# Patient Record
Sex: MALE | Race: WHITE | Employment: OTHER | URBAN - METROPOLITAN AREA
[De-identification: names, ages, dates, MRNs, and addresses within clinical notes are randomized per-mention and may not be internally consistent; named-entity substitution may affect disease eponyms.]

---

## 2022-06-13 DIAGNOSIS — M25.561 RIGHT KNEE PAIN, UNSPECIFIED CHRONICITY: Primary | ICD-10-CM

## 2022-06-15 DIAGNOSIS — M25.561 RIGHT KNEE PAIN, UNSPECIFIED CHRONICITY: ICD-10-CM

## 2022-06-15 PROCEDURE — 73562 X-RAY EXAM OF KNEE 3: CPT | Performed by: ORTHOPAEDIC SURGERY

## 2022-06-21 ENCOUNTER — VIRTUAL VISIT (OUTPATIENT)
Dept: ORTHOPEDIC SURGERY | Age: 72
End: 2022-06-21
Payer: MEDICARE

## 2022-06-21 DIAGNOSIS — M25.561 PAIN IN BOTH KNEES, UNSPECIFIED CHRONICITY: Primary | ICD-10-CM

## 2022-06-21 DIAGNOSIS — M25.561 PAIN IN BOTH KNEES, UNSPECIFIED CHRONICITY: ICD-10-CM

## 2022-06-21 DIAGNOSIS — M25.562 PAIN IN BOTH KNEES, UNSPECIFIED CHRONICITY: ICD-10-CM

## 2022-06-21 DIAGNOSIS — M25.562 PAIN IN BOTH KNEES, UNSPECIFIED CHRONICITY: Primary | ICD-10-CM

## 2022-06-21 DIAGNOSIS — M17.0 OSTEOARTHRITIS OF BOTH KNEES, UNSPECIFIED OSTEOARTHRITIS TYPE: ICD-10-CM

## 2022-06-21 PROCEDURE — 99204 OFFICE O/P NEW MOD 45 MIN: CPT | Performed by: ORTHOPAEDIC SURGERY

## 2022-06-21 RX ORDER — HYDROCODONE BITARTRATE AND ACETAMINOPHEN 7.5; 325 MG/1; MG/1
1 TABLET ORAL
COMMUNITY
Start: 2022-02-09 | End: 2022-09-28

## 2022-06-21 RX ORDER — ONDANSETRON 4 MG/1
4 TABLET, FILM COATED ORAL
COMMUNITY
Start: 2022-02-09

## 2022-06-21 RX ORDER — FLUOCINOLONE ACETONIDE 0.11 MG/ML
4 OIL AURICULAR (OTIC)
COMMUNITY
Start: 2021-08-25

## 2022-06-21 RX ORDER — MULTIVITAMIN
1 CAPSULE ORAL DAILY
COMMUNITY
End: 2022-09-28

## 2022-06-21 NOTE — PROGRESS NOTES
Name: Jessica Wilkerson    : 1950     Service Dept: [address] [place] [address] and Sports Medicine    Patient's Pharmacies:    [facility]  Phone: [phone]     [facility] [id number], [address] [id number]  Phone: [phone] Fax: [phone]       Chief Complaint   Patient presents with    Knee Pain        There were no vitals taken for this visit. Allergies   Allergen Reactions    Demerol [Meperidine] Other (comments)     Sweating ,tunnel vision      Current Outpatient Medications   Medication Sig Dispense Refill    fluocinolone acetonide oiL 0.01 % drop 4 Drops by Otic route.  HYDROcodone-acetaminophen (NORCO) 7.5-325 mg per tablet Take 1 Tablet by mouth every four (4) hours as needed.  ondansetron hcl (ZOFRAN) 4 mg tablet Take 4 mg by mouth every eight (8) hours as needed.  lactobacillus rhamnosus gg 10 billion cell (CULTURELLE) 10 billion cell capsule Take 1 Capsule by mouth daily.  multivitamin capsule Take 1 Capsule by mouth daily.  oxyCODONE-acetaminophen (PERCOCET) 5-325 mg per tablet Take 1 Tab by mouth every four (4) hours as needed for Pain. Max Daily Amount: 6 Tabs. 30 Tab 0    omeprazole (PRILOSEC) 20 mg capsule Take 20 mg by mouth as needed. Indications: GASTROESOPHAGEAL REFLUX      fexofenadine (ALLEGRA) 180 mg tablet Take 180 mg by mouth daily as needed for Allergies.  vitamin E (AQUA GEMS) 400 unit capsule Take 400 Units by mouth daily.  ascorbic acid (VITAMIN C) 500 mg tablet Take 1,000 mg by mouth daily.  b complex vitamins (B COMPLEX 1) tablet Take 1 Tab by mouth daily.  CALCIUM CARBONATE/VITAMIN D3 (CALCIUM 600 + D,3, PO) Take  by mouth daily.  selenium 200 mcg tab Take 400 mcg by mouth daily.  ENZYMES,DIGESTIVE (DIGESTIVE ENZYMES PO) Take  by mouth daily.         There is no problem list on file for this patient. Family History   Problem Relation Age of Onset    Heart Disease Mother     Diabetes Mother     No Known Problems Father       Social History     Socioeconomic History    Marital status:    Tobacco Use    Smoking status: Never Smoker    Smokeless tobacco: Never Used   Vaping Use    Vaping Use: Never used   Substance and Sexual Activity    Alcohol use: No    Drug use: No    Sexual activity: Yes      Past Surgical History:   Procedure Laterality Date    HX APPENDECTOMY      HX HERNIA REPAIR      HX KNEE ARTHROSCOPY      right    HX MOHS PROCEDURES  5-2015    left      Past Medical History:   Diagnosis Date    GERD (gastroesophageal reflux disease)         I have reviewed and agree with PFS and ROS and intake form in chart and the record furthermore I have reviewed prior medical record(s) regarding this patients care during this appointment. Review of Systems:   Patient is a pleasant appearing individual, appropriately dressed, well hydrated, well nourished, who is alert, appropriately oriented for age, and in no acute distress with a normal gait and normal affect who does not appear to be in any significant pain. Physical Exam:  Left Knee -Decrease range of motion with flexion, Knee arc of greater than 50 degrees, Some crepitation, Grossly neurovascularly intact, Good cap refill, No skin lesion, Moderate swelling, No gross instability, Some quadriceps weakness Kellgren and Maik at least grade 3    Right Knee -Decrease range of motion with flexion, Some crepitation, Grossly neurovascularly intact, Good cap refill, No skin lesion, Moderate swelling, No gross instability, Some quadriceps weaknessKellgren and Maik at least grade 3   Encounter Diagnoses     ICD-10-CM ICD-9-CM   1. Pain in both knees, unspecified chronicity  M25.561 719.46    M25.562    2.  Osteoarthritis of both knees, unspecified osteoarthritis type  M17.0 715.96       HPI:  The patient is here with a chief complaint of right knee pain, throbbing, burning pain, progressively getting worse. Pain is 8/10. Continues to have difficulty. X-rays are positive for severe OA. Assessment/Plan:  Plan will be for right total knee replacement, general medical clearance. No history of blood clots, does not take any blood thinners and we will get the patient set up and go from there. If the patient gets worse, he is to give me a call. No restrictions in the meantime. We will get PVLs the day before. Since he is coming from a long distance he is going to stay with his daughter locally and go from there. As part of continued conservative pain management options the patient was advised to utilize Tylenol or OTC NSAIDS as long as it is not medically contraindicated. Hunter Dakin, was evaluated through a synchronous (real-time) audio-video encounter. The patient (or guardian if applicable) is aware that this is a billable service. Verbal consent to proceed has been obtained within the past 12 months. The visit was conducted pursuant to the emergency declaration under the [address] [place] [address], [address] [place] [address] authority and the [employer] Act. Patient identification was verified, and a caregiver was present when appropriate. The patient was located in a state where the provider was credentialed to provide care. Return to Office: Follow-up and Dispositions    · Return for schedule for surgery. Scribed by Mihai Erickson LPN as dictated by [facility] JUAN MIGUEL Rivas MD.  Documentation True and Accepted Nilay Rivas MD

## 2022-06-21 NOTE — PATIENT INSTRUCTIONS
Knee Arthritis: Care Instructions  Your Care Instructions     Knee arthritis is a breakdown of the cartilage that cushions your knee joint. When the cartilage wears down, your bones rub against each other. This causes pain and stiffness. Knee arthritis tends to get worse with time. Treatment for knee arthritis involves reducing pain, making the leg muscles stronger, and staying at a healthy body weight. The treatment usually does not improve the health of the cartilage, but it can reduce pain and improve how well your knee works. You can take simple measures to protect your knee joints, ease your pain, and help you stay active. Follow-up care is a key part of your treatment and safety. Be sure to make and go to all appointments, and call your doctor if you are having problems. It's also a good idea to know your test results and keep a list of the medicines you take. How can you care for yourself at home? · Know that knee arthritis will cause more pain on some days than on others. · Stay at a healthy weight. Lose weight if you are overweight. When you stand up, the pressure on your knees from every pound of body weight is multiplied four times. So if you lose 10 pounds, you will reduce the pressure on your knees by 40 pounds. · Talk to your doctor or physical therapist about exercises that will help ease joint pain. ? Stretch to help prevent stiffness and to prevent injury before you exercise. You may enjoy gentle forms of yoga to help keep your knee joints and muscles flexible. ? Walk instead of jog.  ? Ride a bike. This makes your thigh muscles stronger and takes pressure off your knee. ? Wear well-fitting and comfortable shoes. ? Exercise in chest-deep water. This can help you exercise longer with less pain. ? Avoid exercises that include squatting or kneeling. They can put a lot of strain on your knees.   ? Talk to your doctor to make sure that the exercise you do is not making the arthritis worse.  · Do not sit for long periods of time. Try to walk once in a while to keep your knee from getting stiff. · Ask your doctor or physical therapist whether shoe inserts may reduce your arthritis pain. · If you can afford it, get new athletic shoes at least every year. This can help reduce the strain on your knees. · Use a device to help you do everyday activities. ? A cane or walking stick can help you keep your balance when you walk. Hold the cane or walking stick in the hand opposite the painful knee. ? If you feel like you may fall when you walk, try using crutches or a front-wheeled walker. These can prevent falls that could cause more damage to your knee. ? A knee brace may help keep your knee stable and prevent pain. ? You also can use other things to make life easier, such as a higher toilet seat and handrails in the bathtub or shower. · Take pain medicines exactly as directed. ? Do not wait until you are in severe pain. You will get better results if you take it sooner. ? If you are not taking a prescription pain medicine, take an over-the-counter medicine such as acetaminophen (Tylenol), ibuprofen (Advil, Motrin), or naproxen (Aleve). Read and follow all instructions on the label. ? Do not take two or more pain medicines at the same time unless the doctor told you to. Many pain medicines have acetaminophen, which is Tylenol. Too much acetaminophen (Tylenol) can be harmful. ? Tell your doctor if you take a blood thinner, have diabetes, or have allergies to shellfish. · Ask your doctor if you might benefit from a shot of steroid medicine into your knee. This may provide pain relief for several months. · Many people take the supplements glucosamine and chondroitin for osteoarthritis. Some people feel they help, but the medical research does not show that they work. Talk to your doctor before you take these supplements. When should you call for help?    Call your doctor now or seek immediate medical care if:    · You have sudden swelling, warmth, or pain in your knee.     · You have knee pain and a fever or rash.     · You have such bad pain that you cannot use your knee. Watch closely for changes in your health, and be sure to contact your doctor if you have any problems. Where can you learn more? Go to http://www.gray.com/  Enter W187 in the search box to learn more about \"Knee Arthritis: Care Instructions. \"  Current as of: December 20, 2021               Content Version: 13.2  © 8003-0620 [employer]. Care instructions adapted under license by [employer] (which disclaims liability or warranty for this information). If you have questions about a medical condition or this instruction, always ask your healthcare professional. [address] any warranty or liability for your use of this information.

## 2022-06-29 DIAGNOSIS — M25.562 PAIN IN BOTH KNEES, UNSPECIFIED CHRONICITY: Primary | ICD-10-CM

## 2022-06-29 DIAGNOSIS — M25.561 PAIN IN BOTH KNEES, UNSPECIFIED CHRONICITY: Primary | ICD-10-CM

## 2022-06-29 DIAGNOSIS — M17.0 OSTEOARTHRITIS OF BOTH KNEES, UNSPECIFIED OSTEOARTHRITIS TYPE: ICD-10-CM

## 2022-09-12 NOTE — NURSE NAVIGATOR
Courtney Flores watched the pre op seminar online and received a preoperative education booklet in anticipation of surgery

## 2022-09-20 ENCOUNTER — HOSPITAL ENCOUNTER (OUTPATIENT)
Dept: PREADMISSION TESTING | Age: 72
Discharge: HOME | End: 2022-09-20

## 2022-09-20 VITALS — HEIGHT: 74 IN | BODY MASS INDEX: 26.95 KG/M2 | WEIGHT: 210 LBS

## 2022-09-20 RX ORDER — GLUCOSAMINE/CHONDR SU A SOD 750-600 MG
1500 TABLET ORAL DAILY
COMMUNITY
End: 2022-09-28

## 2022-09-20 RX ORDER — ZINC GLUCONATE 10 MG
400 LOZENGE ORAL DAILY
COMMUNITY

## 2022-09-20 RX ORDER — MELATONIN
10000
COMMUNITY

## 2022-09-20 RX ORDER — LANOLIN ALCOHOL/MO/W.PET/CERES
6 CREAM (GRAM) TOPICAL
COMMUNITY

## 2022-09-20 RX ORDER — ACETAMINOPHEN 160 MG/5ML
200 SUSPENSION, ORAL (FINAL DOSE FORM) ORAL DAILY
COMMUNITY
End: 2022-09-28

## 2022-09-20 RX ORDER — SODIUM CHLORIDE, SODIUM LACTATE, POTASSIUM CHLORIDE, CALCIUM CHLORIDE 600; 310; 30; 20 MG/100ML; MG/100ML; MG/100ML; MG/100ML
125 INJECTION, SOLUTION INTRAVENOUS CONTINUOUS
Status: CANCELLED | OUTPATIENT
Start: 2022-09-20

## 2022-09-20 NOTE — PERIOP NOTES
PAT - SURGICAL PRE-ADMISSION INSTRUCTIONS    NAME:  Marline Montero                                                          TODAY'S DATE:  9/20/2022    SURGERY DATE:  9/28/2022                                  SURGERY ARRIVAL TIME:   TBA    Do NOT eat or drink anything, including candy or gum, after MIDNIGHT on 9/28/2022 , unless you have specific instructions from your Surgeon or Anesthesia Provider to do so. No smoking 24 hours before surgery. No alcohol 24 hours prior to the day of surgery. No recreational drugs for one week prior to the day of surgery. Leave all valuables, including money/purse, at home. Remove all jewelry, nail polish, makeup (including mascara); no lotions, powders, deodorant, or perfume/cologne/after shave. Glasses/Contact lenses and Dentures may be worn to the hospital.  They will be removed prior to surgery. Call your doctor if symptoms of a cold or illness develop within 24 ours prior to surgery. AN ADULT MUST DRIVE YOU HOME AFTER OUTPATIENT SURGERY. If you are having an OUTPATIENT procedure, please make arrangements for a responsible adult to be with you for 24 hours after your surgery. If you are admitted to the hospital, you will be assigned to a bed after surgery is complete. Normally a family member will not be able to see you until you are in your assigned bed. 12. Visitation Restrictions Explained. Special Instructions:  Covid Test not needed.  Patient not vaccinated, Quarantine requirements discussed  Has Advanced Directive no DNR  Bring any pertinent legal medical records., Bring durable medical equipment (DME) needed:  walker    Patient Prep:    use CHG solution three nights prior to procedure     These surgical instructions were reviewed with patient during the PAT  phone call

## 2022-09-23 PROBLEM — M17.11 PRIMARY OSTEOARTHRITIS OF RIGHT KNEE: Chronic | Status: ACTIVE | Noted: 2022-09-23

## 2022-09-23 NOTE — H&P
History and Physical        Patient: Nancy Weinberg               Sex: male          DOA: (Not on file)         YOB: 1950      Age:  66 y.o.        LOS:  LOS: 0 days        HPI:     Theresa Thompson is a 57-year-old right-handed white male referred here today for evaluation and treatment of a right severe lateral tibiofemoral DJD. The patient has been having discomfort in the right knee that has gone on for years. He had two different arthroscopies, one a meniscal repair in 2002 and then a meniscectomy in 2008. He has had continued pain in that knee with stiffness, and he is here today for evaluation and treatment. He is interested in the subvastus approach to the knee. Past Medical History:   Diagnosis Date    Arthritis     Chronic pain     GERD (gastroesophageal reflux disease)        Past Surgical History:   Procedure Laterality Date    HX APPENDECTOMY      HX HERNIA REPAIR      HX KNEE ARTHROSCOPY      right    HX MOHS PROCEDURES  05/01/2015    left    HX ROTATOR CUFF REPAIR Right 2022       Family History   Problem Relation Age of Onset    Heart Disease Mother     Diabetes Mother     No Known Problems Father        Social History     Socioeconomic History    Marital status:    Tobacco Use    Smoking status: Never    Smokeless tobacco: Never   Vaping Use    Vaping Use: Never used   Substance and Sexual Activity    Alcohol use: No    Drug use: No    Sexual activity: Yes       Prior to Admission medications    Medication Sig Start Date End Date Taking? Authorizing Provider   cholecalciferol (Vitamin D3) (1000 Units /25 mcg) tablet Take 10,000 Units by mouth every Monday, Wednesday, Friday. Provider, Historical   TURMERIC PO Take 2 Capsules by mouth daily. Provider, Historical   coenzyme Q-10 (Co Q-10) 200 mg capsule Take 200 mg by mouth daily. Provider, Historical   magnesium 250 mg tab Take 400 mg by mouth daily. Provider, Historical   OTHER Take 50 mg by mouth daily.  Zinc with marko    Provider, Historical   melatonin 3 mg tablet Take 6 mg by mouth nightly. Provider, Historical   OTHER Take 400 mg by mouth daily. Grape seed extract    Provider, Historical   glucosamine/chondr mojica A sod (glucosamine-chondroitin) 750-600 mg tab Take 1,500 mg by mouth daily. Provider, Historical   fluocinolone acetonide oiL 0.01 % drop 4 Drops by Otic route. Patient not taking: Reported on 9/20/2022 8/25/21   Provider, Historical   HYDROcodone-acetaminophen (NORCO) 7.5-325 mg per tablet Take 1 Tablet by mouth every four (4) hours as needed. Patient not taking: Reported on 9/20/2022 2/9/22   Provider, Historical   lactobacillus rhamnosus gg 10 billion cell (CULTURELLE) 10 billion cell capsule Take 1 Capsule by mouth daily. Provider, Historical   multivitamin capsule Take 1 Capsule by mouth daily. Provider, Historical   ondansetron hcl (ZOFRAN) 4 mg tablet Take 4 mg by mouth every eight (8) hours as needed. Patient not taking: Reported on 9/20/2022 2/9/22   Provider, Historical   oxyCODONE-acetaminophen (PERCOCET) 5-325 mg per tablet Take 1 Tab by mouth every four (4) hours as needed for Pain. Max Daily Amount: 6 Tabs. Patient not taking: Reported on 9/20/2022 12/31/15   Abby Devlin MD   vitamin E (AQUA GEMS) 400 unit capsule Take 400 Units by mouth daily. Provider, Historical   ascorbic acid, vitamin C, (VITAMIN C) 500 mg tablet Take 1,000 mg by mouth daily. Provider, Historical   selenium 200 mcg tab Take 400 mcg by mouth daily. Provider, Historical   ENZYMES,DIGESTIVE (DIGESTIVE ENZYMES PO) Take  by mouth daily. Provider, Historical       Allergies   Allergen Reactions    Demerol [Meperidine] Other (comments)     Sweating ,tunnel vision       Review of Systems  GENERAL:  Patient has no signs of fever, chills, weight change or fatigue.   HEAD/ENTM:  Patient has no signs of headaches, dizziness, hearing loss, ringing in ears, sore throat/hoarseness, recent cold, double vision, blurred vision, itchy eyes, eye redness or eye discharge. NEUROLOGIC:  Patient has no signs of fainting, muscle weakness, numbness/tingling, loss of balance or seizure disorder. CARDIOVASCULAR:  Patient has no signs of chest pain, palpitations, rheumatic fever or heart murmur. RESPIRATORY:  Patient has no signs of chronic cough, wheezing, difficulty breathing, pain on breathing or shortness of breath. GASTROINTESTINAL:  Patient has no signs of nausea/vomiting, difficulty swallowing, gas/bloating, indigestion, abdominal pain, diarrhea, bloody stools or hemorrhoids. GENITOURINARY:  Patient has no signs of blood in urine, painful urinating, burning sensation, bladder/kidney infection, frequent urinating or incontinence. MUSCULOSKELETAL: Patient presents with joint pain. Patient has no signs of fracture/dislocation, sprain/strain, tendonitis, joint stiffness, rheumatoid disease, gout or swelling of feet. INTEGUMENTARY:  Patient has no signs of rash/itching, psoriasis, Raynaud's phenomenon or varicose veins. EMOTIONAL:  Patient has no signs of anxiety, depression, bipolar disorder, memory loss or change in mood. Physical Exam:      There were no vitals taken for this visit. Physical Exam:   Physical exam shows a healthy-appearing 59-year-old white male. The right knee has about -5 degrees of extension, but he has pain with this. His flexion can get to a max of 90 degrees with considerable effort. He has mild genu valgum. He has pain on the lateral joint line and positive patellofemoral crepitation. Otherwise, examination of the right knee demonstrates the patient has a negative Lachman and has no varus instability at zero degrees or 30 degrees. There is a negative posterior sag. There is no knee effusion. There is no swelling, ecchymosis, or wounds. The patient has no medial joint line pain and no popliteal pain.   The patient has a negative patellar inhibition, a negative patellar grind, and a negative patellar apprehension test.  There is a negative Jeffry Maneuver. There is no pain at the inferior pole of the patella or the tibial tubercle. The patient has 5/5 muscle strength with good quadriceps tone. The patient has good capillary refill with normal motor strength of the foot and ankle and normal light-touch sensation in the foot and lower leg. Assessment/Plan     Principal Problem:    Primary osteoarthritis of right knee (9/23/2022)      The risks associated with right outpatient total knee arthroplasty using the subvastus approach were reviewed and all questions were answered. The benefits include earlier ambulation, better mobility, and quicker return of muscle function. The risks including pain, bleeding, infection, DVT, need for future surgeries amongst others are reviewed and questions are answered. The patient is going to be scheduled for outpatient TKA using the KAYAK knee replacement system. We have given the patient Keflex for antibiotic prophylaxis and also a prescription for Roxicodone for postoperative pain management. We will use aspirin 81mg twice daily for DVT prophylaxis. The patient will also receive an IV dose of antibiotics preoperatively. The patient will be discharged the same day to home health physical therapy. The patient was counseled on the importance of ice, elevation, muscle stretching and range of motion exercises. The patient will follow up two weeks postoperatively. Review of x-rays show Kellgren-Maik grade 3/4 changes. He did not need a walker today. I have given him a preoperative physical therapy prescription to help work on mobility ahead of time. We will like manipulate his knee in flexion prior to the procedure to get as much flexion prior to beginning the operation.

## 2022-09-27 RX ORDER — SODIUM CHLORIDE 0.9 % (FLUSH) 0.9 %
5-40 SYRINGE (ML) INJECTION EVERY 8 HOURS
Status: CANCELLED | OUTPATIENT
Start: 2022-09-27

## 2022-09-27 RX ORDER — SODIUM CHLORIDE 0.9 % (FLUSH) 0.9 %
5-40 SYRINGE (ML) INJECTION AS NEEDED
Status: CANCELLED | OUTPATIENT
Start: 2022-09-27

## 2022-09-28 ENCOUNTER — ANESTHESIA EVENT (OUTPATIENT)
Dept: SURGERY | Age: 72
End: 2022-09-28
Payer: MEDICARE

## 2022-09-28 ENCOUNTER — ANESTHESIA (OUTPATIENT)
Dept: SURGERY | Age: 72
End: 2022-09-28
Payer: MEDICARE

## 2022-09-28 ENCOUNTER — HOSPITAL ENCOUNTER (OUTPATIENT)
Age: 72
Discharge: HOME HEALTH | End: 2022-09-28
Attending: ORTHOPAEDIC SURGERY | Admitting: ORTHOPAEDIC SURGERY
Payer: MEDICARE

## 2022-09-28 VITALS
SYSTOLIC BLOOD PRESSURE: 147 MMHG | RESPIRATION RATE: 16 BRPM | OXYGEN SATURATION: 98 % | BODY MASS INDEX: 27.36 KG/M2 | WEIGHT: 213.2 LBS | HEIGHT: 74 IN | HEART RATE: 60 BPM | DIASTOLIC BLOOD PRESSURE: 80 MMHG | TEMPERATURE: 97.8 F

## 2022-09-28 PROBLEM — M17.11 RIGHT KNEE DJD: Status: ACTIVE | Noted: 2022-09-28

## 2022-09-28 LAB — GLUCOSE BLD STRIP.AUTO-MCNC: 111 MG/DL (ref 70–110)

## 2022-09-28 PROCEDURE — 97165 OT EVAL LOW COMPLEX 30 MIN: CPT

## 2022-09-28 PROCEDURE — 77030002934 HC SUT MCRYL J&J -B: Performed by: ORTHOPAEDIC SURGERY

## 2022-09-28 PROCEDURE — 77030013708 HC HNDPC SUC IRR PULS STRY –B: Performed by: ORTHOPAEDIC SURGERY

## 2022-09-28 PROCEDURE — 74011000258 HC RX REV CODE- 258: Performed by: ORTHOPAEDIC SURGERY

## 2022-09-28 PROCEDURE — L1830 KO IMMOB CANVAS LONG PRE OTS: HCPCS | Performed by: ORTHOPAEDIC SURGERY

## 2022-09-28 PROCEDURE — 74011250636 HC RX REV CODE- 250/636: Performed by: PHYSICIAN ASSISTANT

## 2022-09-28 PROCEDURE — 74011250636 HC RX REV CODE- 250/636: Performed by: NURSE ANESTHETIST, CERTIFIED REGISTERED

## 2022-09-28 PROCEDURE — 77030027138 HC INCENT SPIROMETER -A: Performed by: ORTHOPAEDIC SURGERY

## 2022-09-28 PROCEDURE — 2709999900 HC NON-CHARGEABLE SUPPLY: Performed by: ORTHOPAEDIC SURGERY

## 2022-09-28 PROCEDURE — 97530 THERAPEUTIC ACTIVITIES: CPT

## 2022-09-28 PROCEDURE — 76210000024 HC REC RM PH II 2.5 TO 3 HR: Performed by: ORTHOPAEDIC SURGERY

## 2022-09-28 PROCEDURE — 74011250637 HC RX REV CODE- 250/637: Performed by: PHYSICIAN ASSISTANT

## 2022-09-28 PROCEDURE — 77030038010: Performed by: ORTHOPAEDIC SURGERY

## 2022-09-28 PROCEDURE — 97535 SELF CARE MNGMENT TRAINING: CPT

## 2022-09-28 PROCEDURE — 76210000063 HC OR PH I REC FIRST 0.5 HR: Performed by: ORTHOPAEDIC SURGERY

## 2022-09-28 PROCEDURE — C1713 ANCHOR/SCREW BN/BN,TIS/BN: HCPCS | Performed by: ORTHOPAEDIC SURGERY

## 2022-09-28 PROCEDURE — 74011250637 HC RX REV CODE- 250/637: Performed by: ANESTHESIOLOGY

## 2022-09-28 PROCEDURE — 77030031139 HC SUT VCRL2 J&J -A: Performed by: ORTHOPAEDIC SURGERY

## 2022-09-28 PROCEDURE — 77030033263 HC DRSG MEPILEX 16-48IN BORD MOLN -B: Performed by: ORTHOPAEDIC SURGERY

## 2022-09-28 PROCEDURE — 77030040361 HC SLV COMPR DVT MDII -B: Performed by: ORTHOPAEDIC SURGERY

## 2022-09-28 PROCEDURE — 97116 GAIT TRAINING THERAPY: CPT

## 2022-09-28 PROCEDURE — 82962 GLUCOSE BLOOD TEST: CPT

## 2022-09-28 PROCEDURE — 74011000250 HC RX REV CODE- 250: Performed by: ANESTHESIOLOGY

## 2022-09-28 PROCEDURE — 76010000153 HC OR TIME 1.5 TO 2 HR: Performed by: ORTHOPAEDIC SURGERY

## 2022-09-28 PROCEDURE — C1776 JOINT DEVICE (IMPLANTABLE): HCPCS | Performed by: ORTHOPAEDIC SURGERY

## 2022-09-28 PROCEDURE — 74011000250 HC RX REV CODE- 250: Performed by: ORTHOPAEDIC SURGERY

## 2022-09-28 PROCEDURE — 64450 NJX AA&/STRD OTHER PN/BRANCH: CPT | Performed by: ORTHOPAEDIC SURGERY

## 2022-09-28 PROCEDURE — 64447 NJX AA&/STRD FEMORAL NRV IMG: CPT | Performed by: ORTHOPAEDIC SURGERY

## 2022-09-28 PROCEDURE — 77030003666 HC NDL SPINAL BD -A: Performed by: ORTHOPAEDIC SURGERY

## 2022-09-28 PROCEDURE — 97161 PT EVAL LOW COMPLEX 20 MIN: CPT

## 2022-09-28 PROCEDURE — 77030034694 HC SCPL CANADY PLSM DISP USMD -E: Performed by: ORTHOPAEDIC SURGERY

## 2022-09-28 PROCEDURE — 74011000250 HC RX REV CODE- 250: Performed by: NURSE ANESTHETIST, CERTIFIED REGISTERED

## 2022-09-28 PROCEDURE — 74011250636 HC RX REV CODE- 250/636: Performed by: ORTHOPAEDIC SURGERY

## 2022-09-28 PROCEDURE — 77030014144 HC TY SPN ANES BBMI -B: Performed by: ANESTHESIOLOGY

## 2022-09-28 PROCEDURE — 76942 ECHO GUIDE FOR BIOPSY: CPT | Performed by: ORTHOPAEDIC SURGERY

## 2022-09-28 PROCEDURE — 77030020782 HC GWN BAIR PAWS FLX 3M -B: Performed by: ORTHOPAEDIC SURGERY

## 2022-09-28 PROCEDURE — 77030034479 HC ADH SKN CLSR PRINEO J&J -B: Performed by: ORTHOPAEDIC SURGERY

## 2022-09-28 PROCEDURE — 76060000034 HC ANESTHESIA 1.5 TO 2 HR: Performed by: ORTHOPAEDIC SURGERY

## 2022-09-28 PROCEDURE — 77030011628: Performed by: ORTHOPAEDIC SURGERY

## 2022-09-28 PROCEDURE — 74011250636 HC RX REV CODE- 250/636: Performed by: ANESTHESIOLOGY

## 2022-09-28 DEVICE — ATTUNE KNEE SYSTEM FEMORAL CRUCIATE RETAINING SIZE 7 RIGHT CEMENTED
Type: IMPLANTABLE DEVICE | Site: KNEE | Status: FUNCTIONAL
Brand: ATTUNE

## 2022-09-28 DEVICE — ATTUNE PATELLA MEDIALIZED ANATOMIC 38MM CEMENTED AOX
Type: IMPLANTABLE DEVICE | Site: KNEE | Status: FUNCTIONAL
Brand: ATTUNE

## 2022-09-28 DEVICE — CEMENT BNE 40GM FULL DOSE PMMA W/O ANTIBIO HI VISC N RADPQ: Type: IMPLANTABLE DEVICE | Site: KNEE | Status: FUNCTIONAL

## 2022-09-28 DEVICE — ATTUNE KNEE SYSTEM TIBIAL INSERT ROTATING PLATFORM CRUCIATE RETAINING SIZE 7 7MM AOX
Type: IMPLANTABLE DEVICE | Site: KNEE | Status: FUNCTIONAL
Brand: ATTUNE

## 2022-09-28 DEVICE — KNEE K1 TOT HEMI STD CEM IMPL CAPPED SYNTHES: Type: IMPLANTABLE DEVICE | Site: KNEE | Status: FUNCTIONAL

## 2022-09-28 DEVICE — ATTUNE KNEE SYSTEM TIBIAL BASE ROTATING PLATFORM SIZE 8 CEMENTED
Type: IMPLANTABLE DEVICE | Site: KNEE | Status: FUNCTIONAL
Brand: ATTUNE

## 2022-09-28 RX ORDER — DEXAMETHASONE SODIUM PHOSPHATE 10 MG/ML
INJECTION INTRAMUSCULAR; INTRAVENOUS
Status: SHIPPED | OUTPATIENT
Start: 2022-09-28 | End: 2022-09-28

## 2022-09-28 RX ORDER — ONDANSETRON HYDROCHLORIDE 8 MG/1
8 TABLET, FILM COATED ORAL
Qty: 30 TABLET | Refills: 0 | Status: SHIPPED | OUTPATIENT
Start: 2022-09-28

## 2022-09-28 RX ORDER — SODIUM CHLORIDE, SODIUM LACTATE, POTASSIUM CHLORIDE, CALCIUM CHLORIDE 600; 310; 30; 20 MG/100ML; MG/100ML; MG/100ML; MG/100ML
75 INJECTION, SOLUTION INTRAVENOUS CONTINUOUS
Status: DISCONTINUED | OUTPATIENT
Start: 2022-09-28 | End: 2022-09-28 | Stop reason: HOSPADM

## 2022-09-28 RX ORDER — CEPHALEXIN 500 MG/1
500 CAPSULE ORAL 4 TIMES DAILY
Qty: 4 CAPSULE | Refills: 0 | Status: SHIPPED
Start: 2022-09-28 | End: 2022-09-29

## 2022-09-28 RX ORDER — MIDAZOLAM HYDROCHLORIDE 1 MG/ML
INJECTION, SOLUTION INTRAMUSCULAR; INTRAVENOUS AS NEEDED
Status: DISCONTINUED | OUTPATIENT
Start: 2022-09-28 | End: 2022-09-28 | Stop reason: HOSPADM

## 2022-09-28 RX ORDER — SODIUM CHLORIDE 0.9 % (FLUSH) 0.9 %
5-40 SYRINGE (ML) INJECTION EVERY 8 HOURS
Status: DISCONTINUED | OUTPATIENT
Start: 2022-09-28 | End: 2022-09-28 | Stop reason: HOSPADM

## 2022-09-28 RX ORDER — ALBUTEROL SULFATE 0.83 MG/ML
2.5 SOLUTION RESPIRATORY (INHALATION) AS NEEDED
Status: DISCONTINUED | OUTPATIENT
Start: 2022-09-28 | End: 2022-09-28 | Stop reason: HOSPADM

## 2022-09-28 RX ORDER — GUAIFENESIN 100 MG/5ML
81 LIQUID (ML) ORAL 2 TIMES DAILY
Qty: 42 TABLET | Refills: 0 | Status: SHIPPED
Start: 2022-09-28

## 2022-09-28 RX ORDER — DEXAMETHASONE SODIUM PHOSPHATE 4 MG/ML
4 INJECTION, SOLUTION INTRA-ARTICULAR; INTRALESIONAL; INTRAMUSCULAR; INTRAVENOUS; SOFT TISSUE ONCE
Status: COMPLETED | OUTPATIENT
Start: 2022-09-28 | End: 2022-09-28

## 2022-09-28 RX ORDER — INSULIN LISPRO 100 [IU]/ML
INJECTION, SOLUTION INTRAVENOUS; SUBCUTANEOUS ONCE
Status: DISCONTINUED | OUTPATIENT
Start: 2022-09-28 | End: 2022-09-28 | Stop reason: HOSPADM

## 2022-09-28 RX ORDER — FENTANYL CITRATE 50 UG/ML
25 INJECTION, SOLUTION INTRAMUSCULAR; INTRAVENOUS AS NEEDED
Status: DISCONTINUED | OUTPATIENT
Start: 2022-09-28 | End: 2022-09-28 | Stop reason: HOSPADM

## 2022-09-28 RX ORDER — ACETAMINOPHEN 500 MG
1000 TABLET ORAL ONCE
Status: COMPLETED | OUTPATIENT
Start: 2022-09-28 | End: 2022-09-28

## 2022-09-28 RX ORDER — HYDROMORPHONE HYDROCHLORIDE 1 MG/ML
0.5 INJECTION, SOLUTION INTRAMUSCULAR; INTRAVENOUS; SUBCUTANEOUS
Status: DISCONTINUED | OUTPATIENT
Start: 2022-09-28 | End: 2022-09-28 | Stop reason: HOSPADM

## 2022-09-28 RX ORDER — PROPOFOL 10 MG/ML
INJECTION, EMULSION INTRAVENOUS
Status: DISCONTINUED | OUTPATIENT
Start: 2022-09-28 | End: 2022-09-28 | Stop reason: HOSPADM

## 2022-09-28 RX ORDER — SODIUM CHLORIDE 0.9 % (FLUSH) 0.9 %
5-40 SYRINGE (ML) INJECTION AS NEEDED
Status: DISCONTINUED | OUTPATIENT
Start: 2022-09-28 | End: 2022-09-28 | Stop reason: HOSPADM

## 2022-09-28 RX ORDER — CEFAZOLIN SODIUM/WATER 2 G/20 ML
2 SYRINGE (ML) INTRAVENOUS ONCE
Status: COMPLETED | OUTPATIENT
Start: 2022-09-28 | End: 2022-09-28

## 2022-09-28 RX ORDER — PANTOPRAZOLE SODIUM 40 MG/1
40 TABLET, DELAYED RELEASE ORAL ONCE
Status: COMPLETED | OUTPATIENT
Start: 2022-09-28 | End: 2022-09-28

## 2022-09-28 RX ORDER — LIDOCAINE HYDROCHLORIDE 20 MG/ML
INJECTION, SOLUTION EPIDURAL; INFILTRATION; INTRACAUDAL; PERINEURAL AS NEEDED
Status: DISCONTINUED | OUTPATIENT
Start: 2022-09-28 | End: 2022-09-28 | Stop reason: HOSPADM

## 2022-09-28 RX ORDER — TRANEXAMIC ACID 650 1/1
1950 TABLET ORAL ONCE
Status: COMPLETED | OUTPATIENT
Start: 2022-09-28 | End: 2022-09-28

## 2022-09-28 RX ORDER — PROPOFOL 10 MG/ML
INJECTION, EMULSION INTRAVENOUS AS NEEDED
Status: DISCONTINUED | OUTPATIENT
Start: 2022-09-28 | End: 2022-09-28 | Stop reason: HOSPADM

## 2022-09-28 RX ORDER — SODIUM CHLORIDE, SODIUM LACTATE, POTASSIUM CHLORIDE, CALCIUM CHLORIDE 600; 310; 30; 20 MG/100ML; MG/100ML; MG/100ML; MG/100ML
125 INJECTION, SOLUTION INTRAVENOUS CONTINUOUS
Status: DISCONTINUED | OUTPATIENT
Start: 2022-09-28 | End: 2022-09-28 | Stop reason: HOSPADM

## 2022-09-28 RX ORDER — DEXMEDETOMIDINE HYDROCHLORIDE 100 UG/ML
INJECTION, SOLUTION INTRAVENOUS
Status: SHIPPED | OUTPATIENT
Start: 2022-09-28 | End: 2022-09-28

## 2022-09-28 RX ORDER — ROPIVACAINE HYDROCHLORIDE 5 MG/ML
INJECTION, SOLUTION EPIDURAL; INFILTRATION; PERINEURAL
Status: SHIPPED | OUTPATIENT
Start: 2022-09-28 | End: 2022-09-28

## 2022-09-28 RX ORDER — MAGNESIUM SULFATE 100 %
4 CRYSTALS MISCELLANEOUS AS NEEDED
Status: DISCONTINUED | OUTPATIENT
Start: 2022-09-28 | End: 2022-09-28 | Stop reason: HOSPADM

## 2022-09-28 RX ORDER — DEXAMETHASONE SODIUM PHOSPHATE 4 MG/ML
8 INJECTION, SOLUTION INTRA-ARTICULAR; INTRALESIONAL; INTRAMUSCULAR; INTRAVENOUS; SOFT TISSUE ONCE
Status: DISCONTINUED | OUTPATIENT
Start: 2022-09-28 | End: 2022-09-28

## 2022-09-28 RX ORDER — OXYCODONE HYDROCHLORIDE 5 MG/1
5 TABLET ORAL
Status: COMPLETED | OUTPATIENT
Start: 2022-09-28 | End: 2022-09-28

## 2022-09-28 RX ORDER — ONDANSETRON 2 MG/ML
INJECTION INTRAMUSCULAR; INTRAVENOUS AS NEEDED
Status: DISCONTINUED | OUTPATIENT
Start: 2022-09-28 | End: 2022-09-28 | Stop reason: HOSPADM

## 2022-09-28 RX ADMIN — OXYCODONE 5 MG: 5 TABLET ORAL at 15:13

## 2022-09-28 RX ADMIN — ACETAMINOPHEN 1000 MG: 500 TABLET ORAL at 09:05

## 2022-09-28 RX ADMIN — ONDANSETRON HYDROCHLORIDE 4 MG: 2 INJECTION INTRAMUSCULAR; INTRAVENOUS at 11:20

## 2022-09-28 RX ADMIN — MIDAZOLAM 2 MG: 1 INJECTION INTRAMUSCULAR; INTRAVENOUS at 10:54

## 2022-09-28 RX ADMIN — Medication 2 G: at 11:13

## 2022-09-28 RX ADMIN — MEPIVACAINE HYDROCHLORIDE 45 MG: 15 INJECTION, SOLUTION EPIDURAL; INFILTRATION at 11:08

## 2022-09-28 RX ADMIN — SODIUM CHLORIDE, SODIUM LACTATE, POTASSIUM CHLORIDE, AND CALCIUM CHLORIDE 1000 ML: 600; 310; 30; 20 INJECTION, SOLUTION INTRAVENOUS at 08:52

## 2022-09-28 RX ADMIN — PROPOFOL 20 MG: 10 INJECTION, EMULSION INTRAVENOUS at 11:28

## 2022-09-28 RX ADMIN — MIDAZOLAM 2 MG: 1 INJECTION INTRAMUSCULAR; INTRAVENOUS at 10:34

## 2022-09-28 RX ADMIN — SODIUM CHLORIDE, SODIUM LACTATE, POTASSIUM CHLORIDE, AND CALCIUM CHLORIDE 125 ML/HR: 600; 310; 30; 20 INJECTION, SOLUTION INTRAVENOUS at 08:51

## 2022-09-28 RX ADMIN — DEXAMETHASONE SODIUM PHOSPHATE 4 MG: 4 INJECTION, SOLUTION INTRAMUSCULAR; INTRAVENOUS at 09:04

## 2022-09-28 RX ADMIN — PANTOPRAZOLE SODIUM 40 MG: 40 TABLET, DELAYED RELEASE ORAL at 09:04

## 2022-09-28 RX ADMIN — DEXMEDETOMIDINE HYDROCHLORIDE 20 MCG: 100 INJECTION, SOLUTION INTRAVENOUS at 10:37

## 2022-09-28 RX ADMIN — ROPIVACAINE HYDROCHLORIDE 25 ML: 5 INJECTION, SOLUTION EPIDURAL; INFILTRATION; PERINEURAL at 10:37

## 2022-09-28 RX ADMIN — LIDOCAINE HYDROCHLORIDE 40 MG: 20 INJECTION, SOLUTION INTRAVENOUS at 11:17

## 2022-09-28 RX ADMIN — TRANEXAMIC ACID 1950 MG: 650 TABLET ORAL at 09:04

## 2022-09-28 RX ADMIN — DEXAMETHASONE SODIUM PHOSPHATE 4 MG: 10 INJECTION, SOLUTION INTRAMUSCULAR; INTRAVENOUS at 10:37

## 2022-09-28 RX ADMIN — PROPOFOL 100 MCG/KG/MIN: 10 INJECTION, EMULSION INTRAVENOUS at 11:14

## 2022-09-28 NOTE — ANESTHESIA POSTPROCEDURE EVALUATION
Procedure(s):  RIGHT TOTAL KNEE ARTHROPLASTY. spinal, MAC, regional    Anesthesia Post Evaluation      Multimodal analgesia: multimodal analgesia used between 6 hours prior to anesthesia start to PACU discharge  Patient location during evaluation: PACU  Patient participation: complete - patient participated  Level of consciousness: awake and alert  Pain score: 0  Pain management: adequate  Airway patency: patent  Anesthetic complications: no  Cardiovascular status: acceptable  Respiratory status: acceptable  Hydration status: acceptable  Post anesthesia nausea and vomiting:  none  Final Post Anesthesia Temperature Assessment:  Normothermia (36.0-37.5 degrees C)      INITIAL Post-op Vital signs:   Vitals Value Taken Time   /73 09/28/22 1310   Temp 36.2 °C (97.2 °F) 09/28/22 1247   Pulse 57 09/28/22 1310   Resp 12 09/28/22 1310   SpO2 95 % 09/28/22 1310   Vitals shown include unvalidated device data.

## 2022-09-28 NOTE — PERIOP NOTES
TRANSFER - IN REPORT:    Verbal report received from CRNA and OR nurse(name) on Daisy Basket  being received from OR(unit) for routine post - op      Report consisted of patients Situation, Background, Assessment and   Recommendations(SBAR). Information from the following report(s) SBAR, Kardex, OR Summary, Procedure Summary, Intake/Output, and MAR was reviewed with the receiving nurse. Opportunity for questions and clarification was provided. Assessment completed upon patients arrival to unit and care assumed.

## 2022-09-28 NOTE — DISCHARGE INSTRUCTIONS
Jayson Royal III, MD Donalynn Flirt, PA-C    Lower Extremity Surgery  Discharge Instructions      Please take the time to review the following instructions before you leave the hospital and use them as guidelines during your recovery from surgery. If you have any questions you may contact my office at [phone]. In the event of an emergency please call 911 or have someone take you to the nearest emergency room. Wound Care/Dressing Changes:    [x]   You may change your dressing as needed. Beginning the 2 days after you are discharged from the hospital you can change your dressing daily. A big, bulky dressing isn't necessary as long as there isn't any drainage from the incisions. There will be a piece of mesh tape over your incision that resembles gauze. This tape should stay on and you should not attempt to remove it. Showering/Bathing:    [x]     You may shower 2 days after surgery. Your dressing may be removed for showering. Do not soak your incision underwater. Weight Bearing Status/Braces/Activity:    You are weight bearing as tolerated on the operative extremity. Ice/Elevation:    Continue ice and elevation consistently for 48 hours after surgery. Medication:    You will be given a prescription for pain medications. This should have been given at your preop appointment. Refills of pain medication are authorized during office hours only (8AM - 5PM Mon thru Fri). You can take 1000 mg of Tylenol every 8 hours. Do not exceed 3000 mg of Tylenol per day. If you have been given Norco for post operative pain, do not take the Tylenol. You should use Aspirin 81 mg twice daily for 21 days from the date of your surgery. This will help to prevent blood clots from forming in your legs. This should be started at dinner the day of your surgery. If you are taking another medication such as Xarelto this should also be started the day after the surgery.   You may resume the medications you were taking prior to your surgery, unless otherwise specified in your discharge instructions. You are to take an oral antibiotic when you get home from the surgery until the prescription is done. This should have been provided at the pre-operative appointment. This antibiotic is generally Keflex or Clindamycin. Follow Up Appointment:    If you are unsure of your follow-up appointment date and time, please call [phone]. Please let our  know you are scheduling a post-op appointment. Most appointments should be between 7-14 days after your surgery. Physical Therapy:    [x]   You are to participate with Home Health Physical Therapy as arranged pre-operatively in the convenience of your own home. Important signs and symptoms:    If any of the following signs and symptoms occurs, you should contact Dr. Izzy Cleary' office. Please be advised if a problem arises which you feel required immediate medical attention or your are unable to contact Dr. Izzy Cleary' office you should seek immediate medical attention. Signs and symptoms to watch for include:  A sudden increase in swelling and/or redness or warmth at the area your surgery was performed which isn't relieved by rest, ice and elevation. Oral temperature greater than 101.5 degrees for 12 hours or more which isn't relieved by an increase in fluid intake and taking two Tylenol every 4-6 hours. Do not exceed 3000 mg of Tylenol per day. Excessive drainage from your incisions or drainage that hasn't stopped by 72 hours. Calf pain, tenderness, redness or swelling which isn't relieved with rest and elevation. Fever, chills, shortness of breath, chest pain, nausea, vomiting or other signs and symptoms which are of concern to you.      DISCHARGE SUMMARY from Nurse    PATIENT INSTRUCTIONS:    After general anesthesia or intravenous sedation, for 24 hours or while taking prescription Narcotics:  Limit your activities  Do not drive and operate hazardous machinery  Do not make important personal or business decisions  Do  not drink alcoholic beverages  If you have not urinated within 8 hours after discharge, please contact your surgeon on call. Report the following to your surgeon:  Excessive pain, swelling, redness or odor of or around the surgical area  Temperature over 100.5  Nausea and vomiting lasting longer than 4 hours or if unable to take medications  Any signs of decreased circulation or nerve impairment to extremity: change in color, persistent  numbness, tingling, coldness or increase pain  Any questions    What to do at Home:  Recommended activity: Ambulate in house and No lifting, Driving, or Strenuous exercise until advised    If you experience any of the following symptoms above, please follow up with Dr. Trev Herrera. *  Please give a list of your current medications to your Primary Care Provider. *  Please update this list whenever your medications are discontinued, doses are      changed, or new medications (including over-the-counter products) are added. *  Please carry medication information at all times in case of emergency situations. These are general instructions for a healthy lifestyle:    No smoking/ No tobacco products/ Avoid exposure to second hand smoke  Surgeon General's Warning:  Quitting smoking now greatly reduces serious risk to your health. Obesity, smoking, and sedentary lifestyle greatly increases your risk for illness    A healthy diet, regular physical exercise & weight monitoring are important for maintaining a healthy lifestyle    You may be retaining fluid if you have a history of heart failure or if you experience any of the following symptoms:  Weight gain of 3 pounds or more overnight or 5 pounds in a week, increased swelling in our hands or feet or shortness of breath while lying flat in bed.   Please call your doctor as soon as you notice any of these symptoms; do not wait until your next office visit. Patient armband removed and shredded     The discharge information has been reviewed with the patient and caregiver. The patient and caregiver verbalized understanding. Discharge medications reviewed with the patient and caregiver and appropriate educational materials and side effects teaching were provided.   ___________________________________________________________________________________________________________________________________

## 2022-09-28 NOTE — PROGRESS NOTES
Problem: Self Care Deficits Care Plan (Adult)  Goal: *Acute Goals and Plan of Care (Insert Text)  Description: Initial Occupational Therapy Goals (9/28/2022) Within 7 day(s):    1. Patient will perform grooming standing sinkside with supervision for increased independence with ADLs. 2. Patient will perform LB dressing with supervision & A/E PRN for increased independence with ADLs. 3. Patient will perform toilet transfer with supervision for increased independence with ADLs. 4. Patient will perform all aspects of toileting with supervision for increased independence with ADLs. 5. Patient will independently apply energy conservation techniques with 1 verbal cue(s)for increased independence with ADLs. 6. Patient will perform bathroom mobility with supervision for increased independence/safety with ADLs. Outcome: Progressing Towards Goal  OCCUPATIONAL THERAPY EVALUATION    Patient: Therese Briones (72 y.o. male)  Date: 9/28/2022  Primary Diagnosis: Right knee DJD [M17.11]  Procedure(s) (LRB):  RIGHT TOTAL KNEE ARTHROPLASTY (Right) Day of Surgery   Precautions: Fall, WBAT  PLOF: pt independent for ADLs/functional mobility    ASSESSMENT AND RECOMMENDATIONS:  Based on the objective data described below, the patient presents with RLE decreased ROM and strength affecting LE ADLs. Pt found supine on stretcher, vitals assessed and WNL, pt reporting pain 6/10, agreeable to therapy. Pt demonstrates poor active listening during session, not receptive to education, and appears anxious with movement. Pt able to sit up to EOB with CGA. Educated pt on proper body mechanics for ADLs s/p TKR. Pt completed upper body dressing with SBA. Pt able to thread B feet through underwear/shorts without assist, and min A when standing to pull up to waist. Upon standing pt states he is not able to feel feet on ground or buttocks. Pt returns to sitting with CGA, and returned to supine with SBA. Daughter present during session.  Will follow up with pt later in day when full sensation returns for improved safety. Patient will benefit from skilled Occupational Therapy intervention to maximize safety/independence with ADLs at d/c.    Education: Reviewed home safety, body mechanics, importance of moving every hour to prevent joint stiffness, role of ice for edema/pain control, Rolling Walker management/safety, and adaptive dressing techniques with patient verbalizing  understanding at this time     Patient will benefit from skilled intervention to address the above impairments. Patient's rehabilitation potential is considered to be Fair  Factors which may influence rehabilitation potential include:   []             None noted  []             Mental ability/status  []             Medical condition  []             Home/family situation and support systems  []             Safety awareness  [x]             Pain tolerance/management  []             Other:        PLAN :  Recommendations and Planned Interventions:   [x]               Self Care Training                  [x]      Therapeutic Activities  [x]               Functional Mobility Training   []      Cognitive Retraining  [x]               Therapeutic Exercises           []      Endurance Activities  [x]               Balance Training                    []      Neuromuscular Re-Education  []               Visual/Perceptual Training     [x]      Home Safety Training  [x]               Patient Education                   [x]      Family Training/Education  []               Other (comment):    Frequency/Duration: Patient will be followed by Occupational Therapy 1-2 times per day/4-7 days per week to address goals.   Discharge Recommendations: Home health with adult supervision at least 24 hours after d/c  Further Equipment Recommendations for Discharge: N/A    AMPAC: Based on an AM-PAC score of 19/24 and their current ADL deficits; it is recommended that the patient have 2-3 sessions per week of Occupational Therapy at d/c to increase the patient's independence. This AMPAC score should be considered in conjunction with interdisciplinary team recommendations to determine the most appropriate discharge setting. Patient's social support, diagnosis, medical stability, and prior level of function should also be taken into consideration. SUBJECTIVE:   Patient stated are you strong enough to hold me? Maryellen Leader    OBJECTIVE DATA SUMMARY:     Past Medical History:   Diagnosis Date    Arthritis     Chronic pain     GERD (gastroesophageal reflux disease)      Past Surgical History:   Procedure Laterality Date    HX APPENDECTOMY      HX HERNIA REPAIR      HX KNEE ARTHROSCOPY      right    HX MOHS PROCEDURES  05/01/2015    left    HX ROTATOR CUFF REPAIR Right 2022     Barriers to Learning/Limitations: yes;  physical, post-anesthesia  Compensate with: visual, verbal, tactile, kinesthetic cues/model    Home Situation/Prior Level of Function:   Home Situation  Home Environment: Private residence  # Steps to Enter: 4  Rails to Enter: Yes  Hand Rails : Bilateral (wide)  One/Two Story Residence: One story  Living Alone: No  Support Systems: Spouse/Significant Other  Patient Expects to be Discharged to[de-identified] Home with home health  Current DME Used/Available at Home: Walker  Tub or Shower Type: Shower  []  Right hand dominant   []  Left hand dominant    Cognitive/Behavioral Status:  Neurologic State: Alert  Orientation Level: Oriented to person;Oriented to place;Oriented to situation  Cognition: Decreased attention/concentration;Decreased command following;Poor safety awareness  Safety/Judgement: Awareness of environment;Decreased insight into deficits; Decreased awareness of need for assistance    Skin: R knee incision w/ Mepilex   Edema: compression hose in place & applied ice     Coordination: BUE  Coordination: Within functional limits  Fine Motor Skills-Upper: Left Intact; Right Intact    Gross Motor Skills-Upper: Left Intact; Right Intact    Balance:  Sitting: Intact  Standing: Impaired; With support    Strength: BUE  Strength: Generally decreased, functional    Tone & Sensation:BUE  Tone: Normal  Sensation: Impaired (R knee)    Range of Motion: BUE  AROM: Generally decreased, functional  PROM: Generally decreased, functional    Functional Mobility and Transfers for ADLs:  Bed Mobility:  Supine to Sit: Contact guard assistance; Additional time (vc)  Sit to Supine: Stand-by assistance  Scooting: Stand-by assistance  Transfers:  Sit to Stand: Contact guard assistance;Minimum assistance;Assist x2; Additional time (vc)    ADL Assessment:  Feeding: Independent  Oral Facial Hygiene/Grooming: Contact guard assistance  Bathing: Moderate assistance  Upper Body Dressing: Stand-by assistance  Lower Body Dressing: Moderate assistance  Toileting: Minimum assistance    ADL Intervention:  Upper Body Dressing Assistance  Dressing Assistance: Stand-by assistance  Pullover Shirt: Stand-by assistance    Lower Body Dressing Assistance  Dressing Assistance: Minimum assistance  Underpants: Minimum assistance  Pants With Button/Zipper: Minimum assistance  Leg Crossed Method Used: No  Position Performed: Seated edge of bed  Cues: Verbal cues provided;Visual cues provided    Cognitive Retraining  Safety/Judgement: Awareness of environment;Decreased insight into deficits; Decreased awareness of need for assistance    Pain:  Pain level pre-treatment: 6/10  Pain level post-treatment: 7/10  Pain Intervention(s): Rest, Ice, Repositioning   Response to intervention: Nurse notified, see doc flow     Activity Tolerance:   Fair. Patient able to stand ~5 minute(s). Patient able to complete ADLs with intermittent rest breaks. Patient limited by pain, strength, ROM. Patient unsteady. Please refer to the flowsheet for vital signs taken during this treatment.   After treatment:   [x]  Patient left in no apparent distress sitting up in chair  []  Patient sitting on EOB  []  Patient left in no apparent distress in bed  [x]  Call bell left within reach  [x]  Nursing notified  []  Caregiver present  [x]  Ice applied  []  SCD's on while back in bed  [] Bed alarm activated    COMMUNICATION/EDUCATION:   Communication/Collaboration:  [x]       Role of Occupational Therapy in the acute care setting. [x]      Home safety education was provided and the patient/caregiver indicated understanding. [x]      Patient/family have participated as able in goal setting and plan of care. [x]      Patient/family agree to work toward stated goals and plan of care. []      Patient understands intent and goals of therapy, but is neutral about his/her participation. []      Patient is unable to participate in plan of care at this time. Thank you for this referral.  Tosha Ribeiro OTR/L  Time Calculation: 23 mins    Eval Complexity: History: MEDIUM Complexity : Expanded review of history including physical, cognitive and psychosocial  history ; Examination: LOW Complexity : 1-3 performance deficits relating to physical, cognitive , or psychosocial skils that result in activity limitations and / or participation restrictions ; Decision Making:LOW Complexity : No comorbidities that affect functional and no verbal or physical assistance needed to complete eval tasks     [address] [place] [id number] AM-PAC® Daily Activity Inpatient Short Form (6-Clicks)*    How much HELP from another person does the patient currently need    (If the patient hasn't done an activity recently, how much help from another person do you think he/she would need if he/she tried?)   Total (Total A or Dep)   A Lot  (Mod to Max A)   A Little (Sup or Min A)   None (Mod I to I)   Putting on and taking off regular lower body clothing? [] 1 [] 2 [x] 3 [] 4   2. Bathing (including washing, rinsing,      drying)? [] 1 [] 2 [x] 3 [] 4   3. Toileting, which includes using toilet, bedpan or urinal?   [] 1 [] 2 [x] 3 [] 4   4.  Putting on and taking off regular upper body clothing? [] 1 [] 2 [x] 3 [] 4   5. Taking care of personal grooming such as brushing teeth? [] 1 [] 2 [x] 3 [] 4   6. Eating meals? [] 1 [] 2 [] 3 [x] 4     Based on an AM-PAC score of 19/24 and their current ADL deficits; it is recommended that the patient have 2-3 sessions per week of Occupational Therapy at d/c to increase the patient's independence.

## 2022-09-28 NOTE — PROGRESS NOTES
Problem: Self Care Deficits Care Plan (Adult)  Goal: *Acute Goals and Plan of Care (Insert Text)  Description: Initial Occupational Therapy Goals (9/28/2022) Within 7 day(s):    1. Patient will perform grooming standing sinkside with supervision for increased independence with ADLs. 2. Patient will perform LB dressing with supervision & A/E PRN for increased independence with ADLs. 3. Patient will perform toilet transfer with supervision for increased independence with ADLs. 4. Patient will perform all aspects of toileting with supervision for increased independence with ADLs. 5. Patient will independently apply energy conservation techniques with 1 verbal cue(s)for increased independence with ADLs. 6. Patient will perform bathroom mobility with supervision for increased independence/safety with ADLs. 9/28/2022 1612 by Chayo Vale OT  Outcome: Progressing Towards Goal  OCCUPATIONAL THERAPY TREATMENT    Patient: Olpez Riding (45 y.o. male)  Date: 9/28/2022  Diagnosis: Right knee DJD [M17.11] Primary osteoarthritis of right knee  Procedure(s) (LRB):  RIGHT TOTAL KNEE ARTHROPLASTY (Right) Day of Surgery  Precautions: Fall, WBAT    Chart, occupational therapy assessment, plan of care, and goals were reviewed. ASSESSMENT:  Pt found seated in recliner, reporting pain 9/10, agreeable to therapy. Pt reports sensation has returned to BLE. Pt CGA/SBA for STS/bathroom mobility/toilet transfer with vc for safe use of RW. Pt voided seated on toilet, SBA for clothing management. Pt washed hands standing at sink with SBA, and ambulated back to recliner. Daughter present during session for education on home safety. Provided opportunity for pt to voice questions on ADL performance when home, pt has no further concerns. Pt left seated in chair, call bell within reach.    Progression toward goals:  [x]          Improving appropriately and progressing toward goals  []          Improving slowly and progressing toward goals  []          Not making progress toward goals and plan of care will be adjusted     PLAN:  Patient continues to benefit from skilled intervention to address the above impairments. Continue treatment per established plan of care. Discharge Recommendations:  Home Health  Further Equipment Recommendations for Discharge:  N/A    AMPAC: Based on an AM-PAC score of 19/24 and their current ADL deficits; it is recommended that the patient have 2-3 sessions per week of Occupational Therapy at d/c to increase the patient's independence. This AMPAC score should be considered in conjunction with interdisciplinary team recommendations to determine the most appropriate discharge setting. Patient's social support, diagnosis, medical stability, and prior level of function should also be taken into consideration. SUBJECTIVE:   Patient stated it hurts bad.     OBJECTIVE DATA SUMMARY:   Cognitive/Behavioral Status:  Neurologic State: Alert  Orientation Level: Oriented to person, Oriented to place, Oriented to situation  Cognition: Decreased attention/concentration, Decreased command following, Follows commands  Safety/Judgement: Awareness of environment, Decreased awareness of need for assistance    Functional Mobility and Transfers for ADLs:   Bed Mobility:  Supine to Sit: Contact guard assistance (vc)  Sit to Supine: Stand-by assistance  Scooting: Stand-by assistance   Transfers:  Sit to Stand: Contact guard assistance;Stand-by assistance (vc)   Bathroom Mobility: Contact guard assistance;Stand-by assistance    Balance:  Sitting: Intact  Standing: Intact; With support    ADL Intervention:  Grooming  Grooming Assistance: Stand-by assistance  Position Performed: Standing  Washing Hands: Stand-by assistance    Upper Body Dressing Assistance  Dressing Assistance: Stand-by assistance  Pullover Shirt: Stand-by assistance    Lower Body Dressing Assistance  Dressing Assistance: Minimum assistance  Underpants: Minimum assistance  Pants With Button/Zipper: Minimum assistance  Leg Crossed Method Used: No  Position Performed: Seated edge of bed  Cues: Verbal cues provided;Visual cues provided    Toileting  Toileting Assistance: Stand-by assistance  Bladder Hygiene: Stand-by assistance  Clothing Management: Stand-by assistance    Cognitive Retraining  Safety/Judgement: Awareness of environment;Decreased awareness of need for assistance    Pain:  Pain level pre-treatment: 9/10   Pain level post-treatment: 9/10  Pain Intervention(s): Rest, Ice, Repositioning   Response to intervention: Nurse notified, See doc flow sheet    Activity Tolerance:    Fair. Pt able to stand ~5 minutes. Pt limited by pain, strength, ROM    Please refer to the flowsheet for vital signs taken during this treatment. After treatment:   [x]  Patient left in no apparent distress sitting up in chair  []  Patient left in no apparent distress in bed  [x]  Call bell left within reach  [x]  Nursing notified  [x]  Caregiver present  []  Bed alarm activated    COMMUNICATION/EDUCATION:   [x] Role of Occupational Therapy in the acute care setting  [x] Home safety education was provided and the patient/caregiver indicated understanding. [x] Patient/family have participated as able in working towards goals and plan of care. [x] Patient/family agree to work toward stated goals and plan of care. [] Patient understands intent and goals of therapy, but is neutral about his/her participation. [] Patient is unable to participate in goal setting and plan of care.       Thank you for this referral.  Lm Ivy OTR/L  Time Calculation: 25 mins    [address] [address] AM-PAC® Daily Activity Inpatient Short Form (6-Clicks)*    How much HELP from another person does the patient currently need    (If the patient hasn't done an activity recently, how much help from another person do you think he/she would need if he/she tried?)   Total (Total A or Dep)   A Lot  (Mod to Max A)   A Little (Sup or Min A)   None (Mod I to I)   Putting on and taking off regular lower body clothing? [] 1 [] 2 [x] 3 [] 4   2. Bathing (including washing, rinsing,      drying)? [] 1 [] 2 [x] 3 [] 4   3. Toileting, which includes using toilet, bedpan or urinal?   [] 1 [] 2 [x] 3 [] 4   4. Putting on and taking off regular upper body clothing? [] 1 [] 2 [x] 3 [] 4   5. Taking care of personal grooming such as brushing teeth? [] 1 [] 2 [x] 3 [] 4   6. Eating meals? [] 1 [] 2 [] 3 [x] 4     Based on an AM-PAC score of 19/24 and their current ADL deficits; it is recommended that the patient have 2-3 sessions per week of Occupational Therapy at d/c to increase the patient's independence.

## 2022-09-28 NOTE — PROGRESS NOTES
Problem: Mobility Impaired (Adult and Pediatric)  Goal: *Acute Goals and Plan of Care (Insert Text)  Description: PT goals to be met in 1 day:  Pt will be able to perform supine<>sit SBA for transfers at home. Pt will be able to perform sit<>stand SBA for increased ability to transfer at home safely. Pt will be able to participate in gt training >100' w/ RW, WBAT, GB and CGA/SBA for improved ability in home upon d/c. Pt will be able to perform stair training step to pattern, B/U rail and CGA to obtain safe entry into home upon d/c. Pt will be educated regarding HEP per MD protocol for optimal AROM/strength outcomes. Note: []  Patient has met MD mobilization critieria for d/c home   []  Recommend HH with 24 hour adult care   [x]  Benefit from additional acute PT session to address:  gait training, stair training    PHYSICAL THERAPY EVALUATION    Patient: Dean Urbina (66 y.o. male)  Date: 9/28/2022  Primary Diagnosis: Right knee DJD [M17.11]  Procedure(s) (LRB):  RIGHT TOTAL KNEE ARTHROPLASTY (Right) Day of Surgery   Precautions:   Fall, WBAT  PLOF: Independent    ASSESSMENT :  Based on the objective data described below, decreased mobility in regards to bed mobility, transfers, gt quality and tolerance, balance, stair negotiation and safety due to R TKA surgery. Decreased AROM of R knee, dec strength of R knee, pain in R knee, dec sensation of R knee also impacting pt functional mobility. Pt rating pain on numerical pain scale pre/post and during session 6/10. Pt and caregiver ed regarding mobility safety, WB, HEP, ice application/use, elevation, environmental safety and home safe techniques. Pt supine in stretcher upon arrival.  Pt able to perform supine<>sit CGA/SBA and sit<>stand w/ CGAx2. Safety vc required throughout session to reinforce safety. Pt unable to participate in gt training using RW, GB, WBAT due to inability to feel feet on ground or buttocks.   Pt was able to stand at stretcher to karma underwear and shorts briefly. Pt will need additional session once block wears off to safely participate in gait and stair training. Answered questions by pt and caregiver in regards to PT and mobility. Pt left supine on stretcher w/ all needs within reach and ice pack to R knee. Nurse Andre aware of session and outcomes. Recommend HHPT with responsible adult care at least 24 hours upon hospital d/c. Patient will benefit from skilled intervention to address the above impairments. Patient's rehabilitation potential is considered to be Fair  Factors which may influence rehabilitation potential include:   []         None noted  []         Mental ability/status  [x]         Medical condition  []         Home/family situation and support systems  []         Safety awareness  [x]         Pain tolerance/management  []         Other:      PLAN :  Recommendations and Planned Interventions:   [x]           Bed Mobility Training             []    Neuromuscular Re-Education  [x]           Transfer Training                   []    Orthotic/Prosthetic Training  [x]           Gait Training                          [x]    Modalities  [x]           Therapeutic Exercises           [x]    Edema Management/Control  [x]           Therapeutic Activities            [x]    Family Training/Education  [x]           Patient Education  []           Other (comment):    Frequency/Duration: Patient will be followed by physical therapy 1-2 times per day/4-7 days per week to address goals. Discharge Recommendations: Home Health  Further Equipment Recommendations for Discharge: N/A    AMPAC: 18/24    This AMPAC score should be considered in conjunction with interdisciplinary team recommendations to determine the most appropriate discharge setting. Patient's social support, diagnosis, medical stability, and prior level of function should also be taken into consideration.      SUBJECTIVE:   Patient stated I feel the pain creeping up.    OBJECTIVE DATA SUMMARY:     Past Medical History:   Diagnosis Date    Arthritis     Chronic pain     GERD (gastroesophageal reflux disease)      Past Surgical History:   Procedure Laterality Date    HX APPENDECTOMY      HX HERNIA REPAIR      HX KNEE ARTHROSCOPY      right    HX MOHS PROCEDURES  05/01/2015    left    HX ROTATOR CUFF REPAIR Right 2022     Barriers to Learning/Limitations: yes;  other anesthesia  Compensate with: Visual Cues, Verbal Cues, Tactile Cues, and Kinesthetic Cues  Home Situation:  Home Situation  Home Environment: Private residence  # Steps to Enter: 4  Rails to Enter: Yes  Hand Rails : Bilateral (wide)  One/Two Story Residence: One story  Living Alone: No  Support Systems: Spouse/Significant Other  Patient Expects to be Discharged to[de-identified] Home with home health  Current DME Used/Available at Home: Walker  Tub or Shower Type: Shower  Critical Behavior:  Neurologic State: Alert  Orientation Level: Oriented to person;Oriented to place;Oriented to situation  Cognition: Decreased attention/concentration;Decreased command following; Follows commands  Safety/Judgement: Awareness of environment;Decreased awareness of need for assistance  Psychosocial  Patient Behaviors: Cooperative; Anxious  Family  Behaviors: Supportive;Calm  Skin Condition/Temp: Warm  Family  Behaviors: Supportive;Calm  Skin Integrity: Incision (comment)  Skin Integumentary  Skin Color: Appropriate for ethnicity;Pink  Skin Condition/Temp: Warm  Skin Integrity: Incision (comment)  Strength:    Strength: Generally decreased, functional  Tone & Sensation:   Tone: Normal  Sensation: Impaired (R knee)  Range Of Motion:  AROM: Generally decreased, functional  PROM: Generally decreased, functional  Posture:  Functional Mobility:  Bed Mobility:  Supine to Sit: Contact guard assistance (vc)  Sit to Supine: Stand-by assistance  Scooting: Stand-by assistance  Transfers:  Sit to Stand: Contact guard assistance;Assist x2 (vc)  Stand to Sit: Contact guard assistance;Assist x2 (vc)  Balance:   Sitting: Intact  Standing: Impaired; With support  Wheelchair Mobility:  Ambulation/Gait Training:  Right Side Weight Bearing: As tolerated  Base of Support: Shift to left  Interventions: Safety awareness training; Tactile cues; Verbal cues; Visual/Demos  Therapeutic Exercises:   Encouraged HEP  Pain:  Pain level pre-treatment: 6/10   Pain level post-treatment: 6/10   Pain Intervention(s) : Medication (see MAR); Rest, Ice, Repositioning  Response to intervention: Nurse notified, See doc flow    Activity Tolerance:   Fair  Please refer to the flowsheet for vital signs taken during this treatment. After treatment:   [x]         Patient left in no apparent distress sitting up in chair  []         Patient left in no apparent distress in bed  [x]         Call bell left within reach  [x]         Nursing notified  [x]         Caregiver present  []         Bed alarm activated  []         SCDs applied    COMMUNICATION/EDUCATION:   [x]         Role of Physical Therapy in the acute care setting. [x]         Fall prevention education was provided and the patient/caregiver indicated understanding. [x]         Patient/family have participated as able in goal setting and plan of care. [x]         Patient/family agree to work toward stated goals and plan of care. []         Patient understands intent and goals of therapy, but is neutral about his/her participation. []         Patient is unable to participate in goal setting/plan of care: ongoing with therapy staff.  []         Other:     Thank you for this referral.  Rafael Broderick, PT   Time Calculation: 18 mins      Eval Complexity: History: HIGH Complexity :3+ comorbidities / personal factors will impact the outcome/ POC Exam:MEDIUM Complexity : 3 Standardized tests and measures addressing body structure, function, activity limitation and / or participation in recreation  Presentation: LOW Complexity : Stable, uncomplicated Clinical Decision Making:Low Complexity  Overall Complexity:LOW     Lisa Hyatt AM-PAC® Basic Mobility Inpatient Short Form (6-Clicks) Version 2    How much HELP from another person does the patient currently need    (If the patient hasn't done an activity recently, how much help from another person do you think he/she would need if he/she tried?)   Total (Total A or Dep)   A Lot  (Mod to Max A)   A Little (Sup or Min A)   None (Mod I to I)   Turning from your back to your side while in a flat bed without using bedrails? [] 1 [] 2 [x] 3 [] 4   2. Moving from lying on your back to sitting on the side of a flat bed without using bedrails? [] 1 [] 2 [x] 3 [] 4   3. Moving to and from a bed to a chair (including a wheelchair)? [] 1 [] 2 [x] 3 [] 4   4. Standing up from a chair using your arms (e.g., wheelchair, or bedside chair)? [] 1 [] 2 [x] 3 [] 4   5. Walking in hospital room? [] 1 [] 2 [x] 3 [] 4   6. Climbing 3-5 steps with a railing?+   [] 1 [] 2 [x] 3 [] 4   +If stair climbing cannot be assessed, skip item #6. Sum responses from items 1-5. Based on an AM-PAC score of 18/24 and their current functional mobility deficits, it is recommended that the patient have 3-5 sessions per week of Physical Therapy at d/c to increase the patient's independence.

## 2022-09-28 NOTE — OP NOTES
Right Tourniquetless Cruciate Retaining Cemented Total Knee Arthroplasty    Patient: Rachael Hamilton MRN: [id number]  CSN: [id number]   YOB: 1950  Age: 66 y.o. Sex: male      Date of Surgery:9/28/2022    PREOPERATIVE DIAGNOSES : RIGHT KNEE OSTEOARTHRITIS    POSTOPERATIVE DIAGNOSES : RIGHT KNEE OSTEOARTHRITIS    PROCEDURE: Right tourniquetless cruciate retaining cemented total knee arthroplasty using the Attune knee system by [employer]. IMPLANTS:   Implant Name Type Inv. Item Serial No.  Lot No. LRB No. Used Action   CEMENT BNE 40GM FULL DOSE PMMA W/O ANTIBIO HI VISC N RADPQ - [id number]  CEMENT BNE 40GM FULL DOSE PMMA W/O ANTIBIO HI VISC N RADPQ  [facility] [place] [id number] Right 1 Implanted   COMPONENT FEM SZ 7 R KNEE CRUCE RET OREN ATTUNE - [id number]  COMPONENT FEM SZ 7 R KNEE CRUCE RET OREN ATTUNE  [facility] [place] [id number] Right 1 Implanted   INSERT TIB SZ 7 THK7MM KNEE CRUCE RET ROT PLATFRM ATTUNE - [id number]  INSERT TIB SZ 7 THK7MM KNEE CRUCE RET ROT PLATFRM ATTUNE  [facility] [place] [id number] Right 1 Implanted   BASEPLATE TIB SZ 8 OREN ROT PLATFRM KNEE SYS S + ATTUNE - [id number]  BASEPLATE TIB SZ 8 OREN ROT PLATFRM KNEE SYS S + ATTUNE  [facility] [place] [id number] Right 1 Implanted   COMPONENT PAT [id number] KNEE POLY OREN MEDIALIZED JOCELYN ATTUNE - [id number]  COMPONENT PAT [id number] KNEE POLY OREN MEDIALIZED JOCELYN ATTUNE  [facility] [place] [id number] Right 1 Implanted       SURGEON: Wally Alexander MD    FIRST ASSISTANT: Eufemia Hopper PA-C    SECOND ASSISTANT: Physician Assistant: Puma Antonio PA-C    ANESTHESIA: Spinal    TOURNIQUET TIME:  None    SPECIMEN TO PATHOLOGY:  * No specimens in log *    ESTIMATED BLOOD LOSS: 75cc    FINDINGS:  Same    COMPLICATIONS:  None     INDICATIONS:  A 66 y.o. WHITE/NON- male  with known right severe gonarthrosis.   The patient has bone-on-bone arthritis by x-rays and has failed all conservative interventions including medications, weight loss, physical therapy, relative rest, ambulatory aids and injections. The patient now presents for a right total knee arthroplasty due to constant pain with activities of daily living and diminished safety due to patients pain. OPERATION:  The patient was brought to the operating theater   and, after adequate anesthesia, the right knee was prepped and draped in the   typical sterile fashion. The 1.95gm of po tranexamic acid was already administered 2 hours before surgery. The analgesic cocktail used for the case was 50cc of .25% Marcaine with epinephrine mixed with 30 mg( 1cc ) of Toradol and 30cc of NS ( total of 81 cc). 40cc's was injected in the skin and capsule/quadriceps after the incision. The Argon Wand was used during the case for bleeding control. An anterior midline   incision was then made from just above the patella to the tibial tubercle. The medial and lateral flaps were developed and then a sub vastus approach   to the knee was then performed. The dissection was carried on the proximal   medial tibia from anterior to posterior, taking the anterior half of the   medial meniscus. The lateral joint line was exposed up to the anterolateral   corner, and then the patella was slid lateral and the knee flexed to   greater than 90 degrees with Z retractors being placed. Findings at this   time showed significant arthritic change. The ACL was resected. The PCL was preserved. The external alignment guide for the [employer] system was then lined up with the first webspace. The guide was made   parallel to the anterior tibia and then the cutting guide was placed at a 5   degree slope. It was placed so that approximately a couple of millimeters were   taken off the lowest side. It was then pinned and the proximal tibia was   then resected.  Tibial resection cut alignment was performed with the drop down abram and found to be aligned with the first web space. The femur was then addressed next. The large drill bit was placed down the femoral canal and the distal femoral cutting guide was then pinned to the   anterior femur at 9 mm below the lowest surface, and was placed at a 5   degree valgus angle. The distal femur was then resected, and extension gap   was measured and the correct mm polyethylene thickness was selected. The knee was   then flexed to 90 degrees. The knee was kept at 90 degrees and  / sizer was inserted over the short IM abram and the anterior lateral femur was referenced. The size was then measured and noted. The guide was then distracted so flexion gap equalled extension gap ( and was stable ) and the guide was pinned. The femoral finishing guide was placed over the 2 pins and then 2 lock down pins were placed medially and laterally. The flexion gap   as checked again and found to be stable. The anterior and posterior cuts,   along with both chamfer cuts were then performed. The guide was removed, as   well as any free bony fragments. The posterior horns of both the medial and   lateral menisci were resected. The femoral resection guide was used to cut out the small amount of  bone for the CR femur. The attention was now turned to the tibia. The pickle fork was placed in the posterior part of the tibia and, using the lateral & medial knee retractors, the   tibia was brought into the wound. Any further bony work, as well as any   meniscal work was done at this time to remove these fragments. The tibia   was then sized with the tibial baseplate. This guide was aligned to the medial third of the tibial tubercle. It was pinned in position. The smokestack guide that was on the [employer] system was placed through the top of this baseplate, locking the plate in good position. The guide was then reamed down to the appropriate depth, and then the keel   punch was placed.  The guide system was removed and the trial tibial   polyethylene was snapped into position, and then the appropriate size femur was   placed on the distal femur. There was good fit to both components. The natural patella tracked very well. These components were selected for implantation. The patella was everted. It was measured and 10mm was resected from the articular surface. It was cut by free hand technique and it was cut from the medial articular edge to the lateral articular edge. The patella was   then laterally electrocauterized and then the patellar clamp was   placed on the cut surface of the patella. It was placed perpendicular to   the trochlear groove and then it was flipped into position and the anatomic patella tracked well. The 3 peg holes were drilled and  the excessive bone on the lateral side was resected. The lateral retinaculum was released subperiosteally off the patella. The wound was then irrigated out copiously. The posterior medial and posterior lateral knee was injected with 20cc's each of the remaining analgesic cocktail. All trial components were removed and the real components were opened, and the [employer] cement was mixed on the back table. The knee was then Simpulse lavaged and dried. The cement was then packed on the inferior surface of the tibial baseplate with cement down the cone. The tibia was then impacted. All excessive cement was removed with pickups and a knife. The tibial   polyethylene was then placed into the baseplate, and   then the femur was cemented next with cement being put on the posterior   part of each femoral runner, and then an additional amount of cement placed   in a U-shaped pattern around medial femoral condyle, anterior femur, and   the lateral femoral condyle. This was hand packed into the distal femur. The femoral component was then placed, impacted into position. Any   excessive cement was removed with pickups and a knife.  The patella was   addressed next, and the anatomic patella was cemented by placing cement on the   posterior part of the patellar component. It was placed into the 3 drill   holes and held into position with a clamp until it hardened. The patient now had full extension to flexion of 120 degrees. Once the cement was hardened, the clamp was removed. The patella   tracked with a no-thumbs technique very nicely. The wound was irrigated out. The skin was closed with a running #2 Quill stitch. The shin was then closed with inverted 2-0 Vicryls. The skin was sealed with the Dermabond   Prineo skin system, dressed with a Mepilex dressing then 4x4, ABD's and Large Ace wrap from toes to thigh. The patient returned to the recovery room awake, in   stable condition. All instrument, sponge and needle counts were correct. The knee was dressed with a Mepilex and an Ace wrap and a knee immobilzer. During multiple steps in the procedure the simpulse lavage was used with a 500cc bag of normal saline with 30cc of 5% sterile opthalmologic povidone solution ( .30% ). Before component implantation the bone was irrigated  with normal saline on a bulb syringe. At the end of the case another 500cc normal saline bag (with 50,000 units of bacitracin and 500,000 units of polymixin )was attached to the simpulse lavage and the entire wound reirrgated before closure. A physician assistant was used during the surgery which contributes to better patient outcomes by decreasing operating room time and decreasing the amount of anesthesia the patient had to undergo. The physician assistant helped with positioning and draping of the patient for implantation of implants, retraction of soft tissues so as not to traumatize the tissues. During several parts of the procedure the PA used the Simpulse lavage to irrigate/suction the sterile field which contributed to a  surgical field and decrease in infection rates.  The physician assistant used the cauterization under my guidance to decrease blood loss which limits the need for blood transfusion in the post operative period. The physician assistant instilled local anesthetic which decreased the need for post operative narcotics. During closure the physician assistant was able to close the fascia layer independently using a running Stratafix closure system ensuring a water tight fascia closure and decreasing the risk of deep carlos a-prosthetic infection. The superficial tissues were closed using inverted 2-0 Vicryl sutures by the physician assistant as well. The skin was closed using an Exofin skin closure system so that there was no discharge from the incision. This helps contribute to a lower risk of infection. During the procedure the physician assistant was given the task of irrigating the wound allowing myself to prepare the prosthesis for implantation.                Greta Obrien MD  9/28/2022

## 2022-09-28 NOTE — PERIOP NOTES
Patient and daughter updated that provider caled for pain medication, patient is speaking with daughter, eating crackers and tolerating PO fluids, will attempt to reach provider again

## 2022-09-28 NOTE — PERIOP NOTES
PT/OT in patient's room for assessment    Patient's daughter states father is reporting pain at a 6/10, LM for Dr Manjit Mckeon

## 2022-09-28 NOTE — INTERVAL H&P NOTE
Update History & Physical    The Patient's History and Physical of September 23,   The surgery was reviewed with the patient and I examined the patient. There was no change. The surgical site was confirmed by the patient and me. Plan:  The risk, benefits, expected outcome, and alternative to the recommended procedure have been discussed with the patient. Patient understands and wants to proceed with the procedure.     Electronically signed by Remy Dee MD on 9/28/2022 at 9:05 AM

## 2022-09-28 NOTE — PERIOP NOTES
Patient arrived via stretcher, will remain on stretcher for safety. Patient has call bell and family called to be with patient. Patient is tolerating PO fluids and crackers.  Will monitor closely

## 2022-09-28 NOTE — PERIOP NOTES
TO from Dr. Jose Guadalupe Hernandez, Roxicodone 5mg PO one time for pain, order repeated and confirmed

## 2022-09-28 NOTE — ANESTHESIA PREPROCEDURE EVALUATION
Relevant Problems   No relevant active problems       Anesthetic History   No history of anesthetic complications            Review of Systems / Medical History  Patient summary reviewed, nursing notes reviewed and pertinent labs reviewed    Pulmonary  Within defined limits                 Neuro/Psych   Within defined limits           Cardiovascular  Within defined limits                Exercise tolerance: >4 METS     GI/Hepatic/Renal     GERD: well controlled           Endo/Other        Arthritis     Other Findings              Physical Exam    Airway  Mallampati: III  TM Distance: > 6 cm  Neck ROM: normal range of motion   Mouth opening: Normal     Cardiovascular    Rhythm: regular  Rate: normal         Dental         Pulmonary  Breath sounds clear to auscultation               Abdominal  GI exam deferred       Other Findings            Anesthetic Plan    ASA: 2  Anesthesia type: spinal      Post-op pain plan if not by surgeon: peripheral nerve block single    Induction: Intravenous  Anesthetic plan and risks discussed with: Patient and Son / Daughter      Right ACNB for POA

## 2022-09-28 NOTE — PERIOP NOTES
Pt. Used restroom in pre-op area with assistance. Patient placed on Fior Paws for a minimum of 30 min in  Preop.

## 2022-09-28 NOTE — PROGRESS NOTES
Problem: Mobility Impaired (Adult and Pediatric)  Goal: *Acute Goals and Plan of Care (Insert Text)  Description: PT goals to be met in 1 day:  Pt will be able to perform supine<>sit SBA for transfers at home. Pt will be able to perform sit<>stand SBA for increased ability to transfer at home safely. Pt will be able to participate in gt training >100' w/ RW, WBAT, GB and CGA/SBA for improved ability in home upon d/c. Pt will be able to perform stair training step to pattern, B/U rail and CGA to obtain safe entry into home upon d/c. Pt will be educated regarding HEP per MD protocol for optimal AROM/strength outcomes. 9/28/2022 1617 by Milind Chase PT  Outcome: Progressing Towards Goal  Note: [x]  Patient has met MD mobilization critieria for d/c home   [x]  Recommend HH with 24 hour adult care   []  Benefit from additional acute PT session to address:      PHYSICAL THERAPY TREATMENT    Patient: Nancy Weinberg (66 y.o. male)  Date: 9/28/2022  Diagnosis: Right knee DJD [M17.11] Primary osteoarthritis of right knee  Procedure(s) (LRB):  RIGHT TOTAL KNEE ARTHROPLASTY (Right) Day of Surgery  Precautions: Fall, WBAT    ASSESSMENT:  Pt sitting in recliner upon arrival.  Pt reports sensation improved B LE.  Sit<>stand CGA/SBA and able to participate in gait training using RW, WBAT, CGA and GB w/ antalgic gait pattern. Pt able to participate in stair training using step to pattern, B UE support and CGA. Pt c/o pain 7/10 using numerical pain scale throughout session. Reinforced previous ed provided regarding techniques, mobility and safety. Daughter present throughout session. Nurse Andre aware of session and outcomes. Pt left sitting in recliner w/ ice pack to R knee.   Progression toward goals:   []      Improving appropriately and progressing toward goals  [x]      Improving slowly and progressing toward goals  []      Not making progress toward goals and plan of care will be adjusted     PLAN:  Patient continues to benefit from skilled intervention to address the above impairments. Continue treatment per established plan of care. Discharge Recommendations:  Home Health  Further Equipment Recommendations for Discharge:  RW     SUBJECTIVE:   Patient stated I don't know why they gave me a block because it is hurting.     OBJECTIVE DATA SUMMARY:   Critical Behavior:  Neurologic State: Alert  Orientation Level: Oriented to person, Oriented to place, Oriented to situation  Cognition: Decreased attention/concentration, Decreased command following, Follows commands  Safety/Judgement: Awareness of environment, Decreased awareness of need for assistance  Functional Mobility Training:  Bed Mobility:  Supine to Sit: Contact guard assistance (vc)  Sit to Supine: Stand-by assistance  Scooting: Stand-by assistance  Transfers:  Sit to Stand: Contact guard assistance;Stand-by assistance (vc)  Stand to Sit: Contact guard assistance;Stand-by assistance (vc)  Balance:  Sitting: Intact  Standing: Intact; With support   Range Of Motion:   AROM: Generally decreased, functional   PROM: Generally decreased, functional   Posture:   Wheelchair Mobility:   Ambulation/Gait Training:  Distance (ft): 150 Feet (ft)  Assistive Device: Walker, rolling;Gait belt  Ambulation - Level of Assistance: Contact guard assistance; Additional time (vc)  Gait Abnormalities: Antalgic;Decreased step clearance; Step to gait  Right Side Weight Bearing: As tolerated  Base of Support: Shift to left  Stance: Right decreased  Speed/Lillian: Slow  Step Length: Left shortened;Right shortened  Swing Pattern: Left asymmetrical;Right asymmetrical  Interventions: Safety awareness training; Tactile cues; Verbal cues; Visual/Demos  Stairs:  Number of Stairs Trained: 5  Stairs - Level of Assistance: Contact guard assistance; Additional time (vc)  Rail Use: Both  Neuro Re-Education:  Therapeutic Exercises:   Pain:  Pain level pre-treatment: 7/10  Pain level post-treatment: 7/10 Pain Intervention(s): Medication (see MAR); Rest, Ice, Repositioning   Response to intervention: Nurse notified, See doc flow    Activity Tolerance:   Fair   Please refer to the flowsheet for vital signs taken during this treatment. After treatment:   [x] Patient left in no apparent distress sitting up in chair  [] Patient left in no apparent distress in bed  [x] Call bell left within reach  [x] Nursing notified  [x] Caregiver present  [] Bed alarm activated  [] SCDs applied      COMMUNICATION/EDUCATION:   [x]         Role of Physical Therapy in the acute care setting. [x]         Fall prevention education was provided and the patient/caregiver indicated understanding. [x]         Patient/family have participated as able in working toward goals and plan of care. [x]         Patient/family agree to work toward stated goals and plan of care. []         Patient understands intent and goals of therapy, but is neutral about his/her participation. []         Patient is unable to participate in stated goals/plan of care: ongoing with therapy staff.  []         Other:        Ez Herbert, PT   Time Calculation: 18 mins     [address] [place] [address] [address] [id number] AM-PAC® Basic Mobility Inpatient Short Form (6-Clicks) Version 2    How much HELP from another person does the patient currently need    (If the patient hasn't done an activity recently, how much help from another person do you think he/she would need if he/she tried?)   Total (Total A or Dep)   A Lot  (Mod to Max A)   A Little (Sup or Min A)   None (Mod I to I)   Turning from your back to your side while in a flat bed without using bedrails? [] 1 [] 2 [x] 3 [] 4   2. Moving from lying on your back to sitting on the side of a flat bed without using bedrails? [] 1 [] 2 [x] 3 [] 4   3. Moving to and from a bed to a chair (including a wheelchair)? [] 1 [] 2 [x] 3 [] 4   4. Standing up from a chair using your arms (e.g., wheelchair, or bedside chair)?    [] 1 [] 2 [x] 3 [] 4   5. Walking in hospital room? [] 1 [] 2 [x] 3 [] 4   6. Climbing 3-5 steps with a railing?+   [] 1 [] 2 [x] 3 [] 4   +If stair climbing cannot be assessed, skip item #6. Sum responses from items 1-5. Based on an AM-PAC score of 18/24 and their current functional mobility deficits, it is recommended that the patient have 3-5 sessions per week of Physical Therapy at d/c to increase the patient's independence.

## 2022-09-28 NOTE — ANESTHESIA PROCEDURE NOTES
Peripheral Block    Start time: 9/28/2022 10:34 AM  End time: 9/28/2022 10:38 AM  Performed by: Teo Pena DO  Authorized by: Teo Pena DO       Pre-procedure: Indications: at surgeon's request and post-op pain management    Preanesthetic Checklist: patient identified, risks and benefits discussed, site marked, timeout performed, anesthesia consent given, patient being monitored and fire risk safety assessment completed and verbalized    Timeout Time: 13:34 EDT      Block Type:   Block Type: Adductor canal block  Laterality:  Right  Monitoring:  Standard ASA monitoring, responsive to questions, oxygen, continuous pulse ox, frequent vital sign checks and heart rate  Procedures: ultrasound guided and nerve stimulator    Patient Position: supine  Prep: chlorhexidine    Location:  Mid thigh  Needle Type:  Stimuplex  Needle Gauge:  20 G  Needle Localization:  Ultrasound guidance and nerve stimulator  Medication Injected:  DexmedeTOMidine (PRECEDEX) 100 mcg/mL iv solution - Peripheral Nerve Block   20 mcg - 9/28/2022 10:37:00 AM  dexamethasone (PF) (DECADRON) 10 mg/mL injection - Peripheral Nerve Block   4 mg - 9/28/2022 10:37:00 AM  ropivacaine (PF) (NAROPIN) 5 mg/mL (0.5 %) injection - Peripheral Nerve Block   25 mL - 9/28/2022 10:37:00 AM  Med Admin Time: 9/28/2022 10:37 AM    Assessment:  Number of attempts:  1  Injection Assessment:  Incremental injection every 5 mL, no paresthesia, ultrasound image on chart, no intravascular symptoms, negative aspiration for blood and local visualized surrounding nerve on ultrasound  Patient tolerance:  Patient tolerated the procedure well with no immediate complications  10 mL injected near NVM using nerve stim.

## 2022-09-28 NOTE — PERIOP NOTES
TRANSFER - OUT REPORT:    Verbal report given to JOSIAH Powell(name) on Keith Harrison  being transferred to Phase 2(unit) for routine post - op       Report consisted of patients Situation, Background, Assessment and   Recommendations(SBAR). Information from the following report(s) SBAR, Kardex, OR Summary, Procedure Summary, Intake/Output, and MAR was reviewed with the receiving nurse. Lines:   Peripheral IV 09/28/22 Left Forearm (Active)   Site Assessment Clean, dry, & intact 09/28/22 1300   Phlebitis Assessment 0 09/28/22 1300   Infiltration Assessment 0 09/28/22 1300   Dressing Status Clean, dry, & intact; Occlusive 09/28/22 1300   Dressing Type Tape;Transparent 09/28/22 1300   Hub Color/Line Status Green; Infusing;Patent 09/28/22 1300        Opportunity for questions and clarification was provided.       Patient transported with:   Registered Nurse

## 2022-09-28 NOTE — PERIOP NOTES
TRANSFER - IN REPORT:    Verbal report received from [facility] RN(name) on Norma Sanchez  being received from [facility]) for routine progression of care      Report consisted of patients Situation, Background, Assessment and   Recommendations(SBAR). Information from the following report(s) Procedure Summary, Intake/Output, and MAR was reviewed with the receiving nurse. Opportunity for questions and clarification was provided. Assessment completed upon patients arrival to unit and care assumed.

## 2022-09-28 NOTE — ANESTHESIA PROCEDURE NOTES
Spinal Block    Start time: 9/28/2022 11:00 AM  End time: 9/28/2022 11:08 AM  Performed by: Clare Lawton DO  Authorized by: Clare Lawton DO     Pre-procedure:   Indications: primary anesthetic  Preanesthetic Checklist: patient identified, risks and benefits discussed, anesthesia consent, site marked, patient being monitored, timeout performed and fire risk safety assessment completed and verbalized    Timeout Time: 11:00 EDT      Spinal Block:   Patient Position:  Seated  Prep Region:  Lumbar  Prep: chlorhexidine      Location:  L3-4  Technique:  Single shot  Local: mepivacaine-pf (CARBOCAINE-PF) 1.5% PF injection - Other   45 mg - 9/28/2022 11:08:00 AM    Med Admin Time: 9/28/2022 11:08 AM    Needle:   Needle Type:  Pencan  Needle Gauge:  25 G  Attempts:  2      Events: CSF confirmed, no blood with aspiration and no paresthesia        Assessment:  Insertion:  Uncomplicated  Patient tolerance:  Patient tolerated the procedure well with no immediate complications

## (undated) DEVICE — SYR 20ML LL STRL LF --

## (undated) DEVICE — SOLUTION IV 250ML 0.9% SOD CHL CLR INJ FLX BG CONT PRT CLSR

## (undated) DEVICE — SOL INJ SOD CL 0.9% 500ML BG --

## (undated) DEVICE — GLOVE ORANGE PI 8   MSG9080

## (undated) DEVICE — IMMOBILIZER KNEE UNIV L19IN FOR 12-24IN THGH FOAM T BAR

## (undated) DEVICE — INTENDED FOR TISSUE SEPARATION, AND OTHER PROCEDURES THAT REQUIRE A SHARP SURGICAL BLADE TO PUNCTURE OR CUT.: Brand: BARD-PARKER ® CARBON RIB-BACK BLADES

## (undated) DEVICE — COVER LT HNDL PLAS RIG 2 PER PK

## (undated) DEVICE — SUT VCRL + 2-0 36IN CT1 UD --

## (undated) DEVICE — NDL PRT INJ NSAF BLNT 18GX1.5 --

## (undated) DEVICE — BLADE SAW 1.19X20X90 MM FOR LG BNE

## (undated) DEVICE — THE CANADY HYBRID PLASMA SCALPEL IS AN ELECTROSURGICAL PLASMA SCALPEL THAT USES AN 85MM BENDABLE PADDLE BLADE TIP. THE ELECTROSURGICAL PLASMA SCALPEL IS USED TO SIMULTANEOUSLY CUT AND COAGULATE BIOLOGICAL TISSUE.: Brand: CANADY HYBRID PLASMA PADDLE BLADE

## (undated) DEVICE — GOWN,SIRUS,NONRNF,SETINSLV,XL,20/CS: Brand: MEDLINE

## (undated) DEVICE — SUTURE STRATAFIX SYMMETRIC PDS + 1 SGL ARMED CT 18 IN LEN SXPP1A405

## (undated) DEVICE — GARMENT COMPR M FOR 13IN FT INTMIT SGL BLDR HEM FORC II

## (undated) DEVICE — PACK PROCEDURE SURG TOT KNEE CUST

## (undated) DEVICE — 3 BONE CEMENT MIXER: Brand: MIXEVAC

## (undated) DEVICE — SOL IRRIGATION INJ NACL 0.9% 500ML BTL

## (undated) DEVICE — DRSG MEPILES BORDER AG 4X12 -- 5/BX

## (undated) DEVICE — NDL SPNE QNCKE 18GX3.5IN LF --

## (undated) DEVICE — NEEDLE HYPO 22GA L1.5IN BLK S STL HUB POLYPR SHLD REG BVL

## (undated) DEVICE — SUT VCRL + 1 36IN CT1 VIO --

## (undated) DEVICE — PREP SKN CHLRAPRP APL 26ML STR --

## (undated) DEVICE — HANDPIECE SET WITH HIGH FLOW TIP AND SUCTION TUBE: Brand: INTERPULSE

## (undated) DEVICE — GLOVE SURG SZ 8 L12IN THK75MIL DK GRN LTX FREE

## (undated) DEVICE — GLOVE SURG SZ 75 L12IN FNGR THK79MIL GRN LTX FREE

## (undated) DEVICE — SYSTEM SKIN CLSR 22CM DERMBND PRINEO

## (undated) DEVICE — PIN DRL QUIK HI PERF FOR SIG SYS

## (undated) DEVICE — BLADE SAW W13XL90MM 1.19MM PARA